# Patient Record
Sex: MALE
[De-identification: names, ages, dates, MRNs, and addresses within clinical notes are randomized per-mention and may not be internally consistent; named-entity substitution may affect disease eponyms.]

---

## 2020-05-12 ENCOUNTER — APPOINTMENT (OUTPATIENT)
Dept: DISASTER EMERGENCY | Facility: HOSPITAL | Age: 40
End: 2020-05-12

## 2020-05-12 ENCOUNTER — MESSAGE (OUTPATIENT)
Age: 40
End: 2020-05-12

## 2023-12-10 ENCOUNTER — EMERGENCY (EMERGENCY)
Facility: HOSPITAL | Age: 43
LOS: 1 days | Discharge: HOME | End: 2023-12-10
Attending: STUDENT IN AN ORGANIZED HEALTH CARE EDUCATION/TRAINING PROGRAM
Payer: COMMERCIAL

## 2023-12-10 ENCOUNTER — NON-APPOINTMENT (OUTPATIENT)
Age: 43
End: 2023-12-10

## 2023-12-10 VITALS
WEIGHT: 179.9 LBS | HEIGHT: 68 IN | HEART RATE: 71 BPM | RESPIRATION RATE: 18 BRPM | OXYGEN SATURATION: 95 % | DIASTOLIC BLOOD PRESSURE: 88 MMHG | TEMPERATURE: 98 F | SYSTOLIC BLOOD PRESSURE: 150 MMHG

## 2023-12-10 VITALS
TEMPERATURE: 98 F | HEART RATE: 67 BPM | SYSTOLIC BLOOD PRESSURE: 152 MMHG | RESPIRATION RATE: 18 BRPM | DIASTOLIC BLOOD PRESSURE: 91 MMHG | OXYGEN SATURATION: 97 %

## 2023-12-10 PROCEDURE — 73030 X-RAY EXAM OF SHOULDER: CPT | Mod: 26,RT

## 2023-12-10 PROCEDURE — 20553 NJX 1/MLT TRIGGER POINTS 3/>: CPT | Mod: RT

## 2023-12-10 PROCEDURE — 99284 EMERGENCY DEPT VISIT MOD MDM: CPT | Mod: 25

## 2023-12-10 PROCEDURE — 76377 3D RENDER W/INTRP POSTPROCES: CPT

## 2023-12-10 PROCEDURE — 73000 X-RAY EXAM OF COLLAR BONE: CPT | Mod: 26,RT

## 2023-12-10 PROCEDURE — 73000 X-RAY EXAM OF COLLAR BONE: CPT

## 2023-12-10 PROCEDURE — 73030 X-RAY EXAM OF SHOULDER: CPT

## 2023-12-10 PROCEDURE — 73200 CT UPPER EXTREMITY W/O DYE: CPT | Mod: 26,RT,MA

## 2023-12-10 PROCEDURE — 99285 EMERGENCY DEPT VISIT HI MDM: CPT | Mod: 25

## 2023-12-10 PROCEDURE — 73200 CT UPPER EXTREMITY W/O DYE: CPT | Mod: MA

## 2023-12-10 PROCEDURE — 76377 3D RENDER W/INTRP POSTPROCES: CPT | Mod: 26

## 2023-12-10 RX ORDER — OXYCODONE HYDROCHLORIDE 5 MG/1
5 TABLET ORAL ONCE
Refills: 0 | Status: DISCONTINUED | OUTPATIENT
Start: 2023-12-10 | End: 2023-12-10

## 2023-12-10 RX ORDER — LIDOCAINE 4 G/100G
1 CREAM TOPICAL ONCE
Refills: 0 | Status: COMPLETED | OUTPATIENT
Start: 2023-12-10 | End: 2023-12-10

## 2023-12-10 RX ORDER — CYCLOBENZAPRINE HYDROCHLORIDE 10 MG/1
10 TABLET, FILM COATED ORAL ONCE
Refills: 0 | Status: COMPLETED | OUTPATIENT
Start: 2023-12-10 | End: 2023-12-10

## 2023-12-10 RX ADMIN — CYCLOBENZAPRINE HYDROCHLORIDE 10 MILLIGRAM(S): 10 TABLET, FILM COATED ORAL at 02:23

## 2023-12-10 RX ADMIN — OXYCODONE HYDROCHLORIDE 5 MILLIGRAM(S): 5 TABLET ORAL at 04:09

## 2023-12-10 RX ADMIN — OXYCODONE HYDROCHLORIDE 5 MILLIGRAM(S): 5 TABLET ORAL at 02:30

## 2023-12-10 RX ADMIN — OXYCODONE HYDROCHLORIDE 5 MILLIGRAM(S): 5 TABLET ORAL at 02:07

## 2023-12-10 RX ADMIN — LIDOCAINE 1 PATCH: 4 CREAM TOPICAL at 02:08

## 2023-12-10 NOTE — ED PROVIDER NOTE - CARE PROVIDER_API CALL
Mike Lakhani  Orthopaedic Surgery  [address]  [place] [address]  Phone: [phone]  Fax: [phone]  Follow Up Time:    Mike Lakhani  Orthopaedic Surgery  [address]  [place] [address]  Phone: [phone]  Fax: [phone]  Follow Up Time:    Mike Lakhani  Orthopaedic Surgery  [address] [facility] [facility] [address] [facility] [address]  [place] [address]  Phone: [phone]  Fax: [phone]  Follow Up Time:

## 2023-12-10 NOTE — ED PROVIDER NOTE - NSFOLLOWUPINSTRUCTIONS_ED_ALL_ED_FT
You were seen in the emergency department for right shoulder pain.   Your workup in the emergency department includes xray and CT scan of right shoulder.   You can find the results of all the tests in this discharge packet.   Please follow up with your primary care doctor within 48 hours for continuation of care.   Please follow up with orthopedic doctor within a week for further management.   Return to the emergency department if you experience any new/concerning/worsening symptoms such as but not limited to: fever (>100.3F), intractable nausea, vomiting, chest pain, shortness of breath, worsening pain.

## 2023-12-10 NOTE — ED PROVIDER NOTE - OBJECTIVE STATEMENT
Patient is a 43 year-old-male with no PMH/PSH presents with R shoulder pain s/p workout. Reports initially was sore, however progressively worsen, unable to move right shoulder due to pain. In usual state of health. Tried motrin/tylenol at home without relief.

## 2023-12-10 NOTE — ED PROVIDER NOTE - CARE PLAN
Principal Discharge DX:	Right shoulder pain   1 Principal Discharge DX:	Rotator cuff tendinitis, right

## 2023-12-10 NOTE — ED PROVIDER NOTE - PHYSICAL EXAMINATION
Vitals: I have reviewed the patients vital signs  General: appears uncomfortable   HEENT: Atraumatic, normocephalic, airway patent  Eyes: EOMI, tracking appropriately  Neck: no tracheal deviation, no JVD  Chest/Lungs: no trauma, symmetric chest rise, speaking in complete sentences, no WOB  Heart: skin and extremities well perfused, regular rate and rhythm  Abdomen: soft, nontender and nondistended   Neuro: A+Ox3, ambulating without difficulty, CN grossly intact  MSK: strength at baseline in all extremities, no muscle wasting or atrophy  Skin: no cyanosis, no jaundice, no new emergent lesions Vitals: I have reviewed the patients vital signs  General: appears uncomfortable   HEENT: Atraumatic, normocephalic, airway patent  Eyes: EOMI, tracking appropriately  Neck: no tracheal deviation, no JVD  Chest/Lungs: symmetric chest rise, speaking in complete sentences  Heart: skin and extremities well perfused, regular rate and rhythm  Abdomen: soft, nontender and nondistended   Neuro: A+Ox3, ambulating without difficulty, CN II-XI intact  MSK: tenderness noted over R scapula and R anterior biceps, full ROM of R elbow/wrist/fingers with intact sensation and 5/5 strength   Skin: no cyanosis, no jaundice, no new emergent lesions

## 2023-12-10 NOTE — ED PROVIDER NOTE - NSFOLLOWUPCLINICS_GEN_ALL_ED_FT
[facility] Orthopedic Surgery  Orthopedic Surgery  [address], 3rd & 4th [place] [address]  Phone: [phone]  Fax:      [facility] Orthopedic Surgery  Orthopedic Surgery  [address], 3rd & 4th [place] [address]  Phone: [phone]  Fax:      [facility] Orthopedic Surgery  Orthopedic Surgery  [address], 3rd & 4th [place] [address]  Phone: [phone]  Fax:

## 2023-12-10 NOTE — ED PROCEDURE NOTE - PROCEDURE ADDITIONAL DETAILS
3 trigger point injections.   1st to inferomedial scapular border with 4cc lidocaine 1%  2nd to proximal biceptal attachment with 4cc lidocaine 1%  3rd to superior trapezius muscle belly with 4cc lidocaine 1%    No complications. Adequate analgesia with relaxation of muscular spasm.

## 2023-12-10 NOTE — ED ADULT NURSE NOTE - OBJECTIVE STATEMENT
Pt is a 43y male who presents to [facility] ED from triage c/o of R shoulder injury. Pt endorses this afternoon @ noon he was at the gym lifting weights about 1-2 hours afterwards he felt very sore and @7029-7819 the pain became unbearable, states he took x Advil and x2 Tylenol with no pain relief, pt states "my shoulder and arm are frozen", states pain is unbearable, denies loss of sensation/pins and needles feeling, states it starts R shoulder and down his R arm. Denies PMH or PSH. Pt is A&Ox4 currently denying any HA, SOB, cough, CP, palpitations, abd pain, urinary symptoms, n/v/d/c, fever. Pt ambulates independently at baseline. NKDA. No blood thinners. Pt is a 43y male who presents to [facility] ED from triage c/o of R shoulder injury. Pt endorses this afternoon @ noon he was at the gym lifting weights about 1-2 hours afterwards he felt very sore and @4314-7161 the pain became unbearable, states he took x Advil and x2 Tylenol with no pain relief, pt states "my shoulder and arm are frozen", states pain is unbearable, denies loss of sensation/pins and needles feeling, states it starts R shoulder and down his R arm. Denies PMH or PSH. Pt is A&Ox4 currently denying any HA, SOB, cough, CP, palpitations, abd pain, urinary symptoms, n/v/d/c, fever. Pt ambulates independently at baseline. NKDA. No blood thinners. Pt is a 43y male who presents to [facility] ED from triage c/o of R shoulder injury. Pt endorses this afternoon @ noon he was at the gym lifting weights about 1-2 hours afterwards he felt very sore and @5914-6066 the pain became unbearable, states he took x Advil and x2 Tylenol with no pain relief, pt states "my shoulder and arm are frozen", states pain is unbearable, denies loss of sensation/pins and needles feeling, states it starts R shoulder and down his R arm. Denies PMH or PSH. Pt is A&Ox4 currently denying any HA, SOB, cough, CP, palpitations, abd pain, urinary symptoms, n/v/d/c, fever. Pt ambulates independently at baseline. NKDA. No blood thinners.

## 2023-12-10 NOTE — ED PROVIDER NOTE - ATTENDING CONTRIBUTION TO CARE
Attending (Dipak Cross D.O.):  I have personally seen and examined this patient. I have performed a substantive portion of the visit including all aspects of the medical decision making. Resident, fellow, student, and/or ACP note reviewed. I agree on the plan of care except where noted.    43M here for right shoulder pain s/p bench press and incline bench with dec range of motion, 10/10 pain, no audible pop. Denies paresthesias. Tried motrin and apap w/o sig improvement. Patient hemodynam stable, though uncomfortable. Intact biceps tendon. Nontender forearm. 5/5  strength. Unable to abduct past 5 degrees, externally rotate. +ttp right scapular border, trap, supraspinatus, mildy at proximal bicep attachment at bicipital groove w/o signs nerve entrapment. Hx and exam suggestive of rotator cuff tedinopathy, muscular strain. Eval for fx, dislocation though suspicion is low .Exam w/o findings to suggest subscap hematoma. Plan for xray, analgesia -> xray w/o acute fx/dislocation in independent interp. Given 10mg oxycodone w/o improvement. Given intramuscular lidocaine injection into spasm muscle with significant improvement and improved range of motion. Patient would prefer to f/u CT shoulder as an outpatient and f/u with ortho. Patient remains neurovac intact. Rec heat, ranging shoulder to help prevent adhesive capsulitis. Discussed that pain will likely return but hopefully not as bad. All q answered. Stable for dc with outpt f/u. Steady gait.

## 2023-12-10 NOTE — ED PROVIDER NOTE - CARE PROVIDERS DIRECT ADDRESSES
[email or web address]

## 2023-12-10 NOTE — ED PROVIDER NOTE - CLINICAL SUMMARY MEDICAL DECISION MAKING FREE TEXT BOX
Patient is a 43 year-old-male with no PMH/PSH presents with R shoulder pain s/p workout. Likely rotator cuff tendinopathy vs rotator cuff tear. Will obtain xray of R shoulder/clavicle and provide pain control.

## 2023-12-10 NOTE — ED PROVIDER NOTE - PROGRESS NOTE DETAILS
Lauri PGY3  Ortho consulted. Lauri PGY3  Patient reports improvement in pain after local lidocaine injection and would like to go home now before the CT result comes back. Return precautions reviewed with patient and will provide orthopedic follow up.

## 2023-12-10 NOTE — ED PROVIDER NOTE - PATIENT PORTAL LINK FT
You can access the FollowMyHealth Patient Portal offered by [facility] by registering at the following website: [email or web address]. By joining [employer]’s FollowMyHealth portal, you will also be able to view your health information using other applications (apps) compatible with our system. You can access the FollowMyHealth Patient Portal offered by [facility] by registering at the following website: [email or web address]. By joining [employer]’s FollowMyHealth portal, you will also be able to view your health information using other applications (apps) compatible with our system. You can access the FollowMyHealth Patient Portal offered by [facility] by registering at the following website: [email or web address]. By joining [employer]’s FollowMyHealth portal, you will also be able to view your health information using other applications (apps) compatible with our system.